# Patient Record
Sex: MALE | Race: WHITE
[De-identification: names, ages, dates, MRNs, and addresses within clinical notes are randomized per-mention and may not be internally consistent; named-entity substitution may affect disease eponyms.]

---

## 2017-04-16 ENCOUNTER — HOSPITAL ENCOUNTER (OUTPATIENT)
Dept: HOSPITAL 25 - ED | Age: 67
Setting detail: OBSERVATION
LOS: 1 days | Discharge: HOME | End: 2017-04-17
Attending: INTERNAL MEDICINE | Admitting: INTERNAL MEDICINE
Payer: MEDICARE

## 2017-04-16 DIAGNOSIS — R94.31: ICD-10-CM

## 2017-04-16 DIAGNOSIS — I47.1: ICD-10-CM

## 2017-04-16 DIAGNOSIS — R07.89: Primary | ICD-10-CM

## 2017-04-16 DIAGNOSIS — E66.9: ICD-10-CM

## 2017-04-16 DIAGNOSIS — I25.10: ICD-10-CM

## 2017-04-16 DIAGNOSIS — Z79.899: ICD-10-CM

## 2017-04-16 DIAGNOSIS — K21.9: ICD-10-CM

## 2017-04-16 DIAGNOSIS — I10: ICD-10-CM

## 2017-04-16 DIAGNOSIS — Z88.8: ICD-10-CM

## 2017-04-16 LAB
ALBUMIN SERPL BCG-MCNC: 3.9 G/DL (ref 3.2–5.2)
ALP SERPL-CCNC: 70 U/L (ref 34–104)
ALT SERPL W P-5'-P-CCNC: 44 U/L (ref 7–52)
ANION GAP SERPL CALC-SCNC: 8 MMOL/L (ref 2–11)
AST SERPL-CCNC: 29 U/L (ref 13–39)
BUN SERPL-MCNC: 13 MG/DL (ref 6–24)
BUN/CREAT SERPL: 13 (ref 8–20)
CALCIUM SERPL-MCNC: 9 MG/DL (ref 8.6–10.3)
CHLORIDE SERPL-SCNC: 100 MMOL/L (ref 101–111)
GLOBULIN SER CALC-MCNC: 2.7 G/DL (ref 2–4)
GLUCOSE SERPL-MCNC: 102 MG/DL (ref 70–100)
HCO3 SERPL-SCNC: 24 MMOL/L (ref 22–32)
HCT VFR BLD AUTO: 45 % (ref 42–52)
HGB BLD-MCNC: 15.4 G/DL (ref 14–18)
MCH RBC QN AUTO: 32 PG (ref 27–31)
MCHC RBC AUTO-ENTMCNC: 35 G/DL (ref 31–36)
MCV RBC AUTO: 92 FL (ref 80–94)
POTASSIUM SERPL-SCNC: 4 MMOL/L (ref 3.5–5)
PROT SERPL-MCNC: 6.6 G/DL (ref 6.4–8.9)
RBC # BLD AUTO: 4.84 10^6/UL (ref 4–5.4)
SODIUM SERPL-SCNC: 132 MMOL/L (ref 133–145)
TROPONIN I SERPL-MCNC: 0 NG/ML (ref ?–0.04)
WBC # BLD AUTO: 7.7 10^3/UL (ref 3.5–10.8)

## 2017-04-16 PROCEDURE — 85025 COMPLETE CBC W/AUTO DIFF WBC: CPT

## 2017-04-16 PROCEDURE — 78452 HT MUSCLE IMAGE SPECT MULT: CPT

## 2017-04-16 PROCEDURE — 80061 LIPID PANEL: CPT

## 2017-04-16 PROCEDURE — 84484 ASSAY OF TROPONIN QUANT: CPT

## 2017-04-16 PROCEDURE — 85379 FIBRIN DEGRADATION QUANT: CPT

## 2017-04-16 PROCEDURE — 93005 ELECTROCARDIOGRAM TRACING: CPT

## 2017-04-16 PROCEDURE — 80053 COMPREHEN METABOLIC PANEL: CPT

## 2017-04-16 PROCEDURE — 99284 EMERGENCY DEPT VISIT MOD MDM: CPT

## 2017-04-16 PROCEDURE — 83880 ASSAY OF NATRIURETIC PEPTIDE: CPT

## 2017-04-16 PROCEDURE — 36415 COLL VENOUS BLD VENIPUNCTURE: CPT

## 2017-04-16 PROCEDURE — G0378 HOSPITAL OBSERVATION PER HR: HCPCS

## 2017-04-16 PROCEDURE — A9502 TC99M TETROFOSMIN: HCPCS

## 2017-04-16 PROCEDURE — 93017 CV STRESS TEST TRACING ONLY: CPT

## 2017-04-16 PROCEDURE — 71010: CPT

## 2017-04-16 PROCEDURE — 83605 ASSAY OF LACTIC ACID: CPT

## 2017-04-16 PROCEDURE — 83036 HEMOGLOBIN GLYCOSYLATED A1C: CPT

## 2017-04-16 PROCEDURE — 85610 PROTHROMBIN TIME: CPT

## 2017-04-16 RX ADMIN — METOPROLOL TARTRATE SCH MG: 25 TABLET, FILM COATED ORAL at 21:07

## 2017-04-16 NOTE — HP
ADMISSION HISTORY AND PHYSICAL:

 

DATE OF ADMISSION:  17

 

PRIMARY CARE PROVIDER:  Dr. Urbina.

 

RHEUMATOLOGIST:  Dr. Zurita.

 

CARDIOLOGIST:  Dr. Stacy.

 

HEALTHCARE PROXY:  _____.

 

CODE STATUS:  DNR/DNI discussed at length with the patient.

 

SOURCE OF INFORMATION:  History obtained from interview with the patient, 
review of past medical records, and [facility].

 

RELIABILITY:  Good.

 

CHIEF COMPLAINT:  Chest heaviness.

 

HISTORY OF PRESENT ILLNESS:  This is a 67-year-old man, past medical history of 
CAD with MI in 2007 as well as history of noncardiac chest pain.  Last stress 
test 2015 notable for poor exercise capacity, did have transient chest 
tightness at that time, and achieved 85% of target heart rate with no EKG 
changes.  Nuclear imaging showed at that time fixed apical defect consistent 
with apical thinning similar to previous imaging without evidence of ischemia.  
This morning at 10 a.m., he was doing laundry and felt the heaviness over his 
chest described as small animal approximately 12 pounds when asked to assign a 
weight, radiating to this throat like there is a "lump in his throat," similar 
in description to the described pain in 2015.  He felt suddenly tired without 
associated diaphoresis.  No nausea, vomiting, palpitations, or syncope.  He 
noticed that the discomfort lasted for several hours.  So, he drove himself to 
the emergency room, noted it resolved prior to presenting.  He did not take any 
nitroglycerin.  He describes that this discomfort as different than in 2007, 
which was described as sharp.  His exercise is limited by joint pain.  He walks 
down the hill and back up again to get his daughter from the bus.  It is 
approximately tenth of a mile.  He walks slowly and does not experience chest 
discomfort.  Of note, his only medication is metoprolol and does not take any 
antiplatelet agents as primary prevention at his own discretion.

 

When seen by this author, the patient had no complaints.

 

PAST MEDICAL HISTORY:  CAD with an MI in , history of noncardiac chest pain
, worked up in 2015, history of GERD, chronic pain syndrome, 
hypertension, SVT, sleep apnea, left knee reconstruction, diabetes per _____ 
which the patient denies, obesity, SVT with rates up to 220.

 

MEDICATIONS:

1.  Metoprolol 12.5 mg twice daily.

2.  Tylenol 500 mg every 6 hours as needed for pain.

 

ALLERGIES:  To ALTACE, STATINS, and LATEX.

 

FAMILY HISTORY:  His mother  of complications from lupus.  His father had 
no significant medical history.

 

SOCIAL HISTORY:  No alcohol, tobacco, or illicits.  Retired .

 

REVIEW OF SYSTEMS:  As per HPI.  Otherwise, all other systems negative.

 

                               PHYSICAL EXAMINATION

 

GENERAL:  Obese man, sitting up in the bed, interactive, pleasant, in no 
apparent distress.

 

VITAL SIGNS:  In the emergency room, when seen by this author, 164/109, 185/117 
on presentation; heart rate 99; respiratory rate 16; 96% on room air; T-max in 
the emergency room 98.2.

 

HEENT:  Oropharynx is clear.  He has moist mucous membranes.  Sclerae are 
anicteric.

 

NECK:  Has non-elevated JVD.  No supraclavicular or cervical lymphadenopathy.

 

LUNGS:  Clear to auscultation.

 

HEART:  Regular rate and rhythm.  Soft 1 to 2 out of 6 systolic ejection murmur.

 

ABDOMEN:  Soft, nontender, nondistended with positive bowel sounds.

 

EXTREMITIES:  Warm and well perfused without clubbing, cyanosis, or edema.  
Good skin turgor.  Less than 2-second capillary refill.

 

NEUROLOGIC:  He is alert and oriented x3.  His cranial nerves II through XII 
are intact.  Gait was not assessed.  There is no apparent anxiety, agitation, 
or depression.

 

 LABORATORY DATA AND DIAGNOSTIC STUDIES:  Laboratory data reviewed.  Notable 
for troponin 0.00.  BNP 11, BUN 13, creatinine 1.0, sodium 132.  D-dimer less 
than 200. White blood cell count 7.7, hemoglobin 15.4, and platelets 200.

 

EKG:  Sinus tachycardia, 97, left axis, T-wave inversion in aVL and early R-
wave progression.

 

Chest x-ray:  Low lung volumes.  No cardiopulmonary disease.

 

ASSESSMENT AND PLAN:  This is a 67-year-old man, past medical history of 
coronary artery disease and myocardial infarction in  as well as history of 
noncardiac chest pain, last worked up in 2015, presenting to the 
hospital with chest discomfort described as heavy and is radiating to his 
throat.

 

Chest pain:  In the setting of history of coronary artery disease, certainly 
concern is the patient's symptoms can represent ischemia, although not 
corroborated by negative troponin and normal EKG at this time.  There has been 
concern in the past of esophageal spasm, seen by Dr. Stacy in 2015.  
The patient has been evaluated in the past, , by our Gastroenterology 
group.  However, specifically, this gentleman is obese and on no secondary 
prophylaxis of antiplatelet agents, I believe is warranting serial troponins 
and stress test prior to discharge home.  We will check serial troponins.  
Continue aspirin while here.  Plan on chemical stress with nuclear imaging.  
The patient has inability to exercise due to joint pain. Check lipids in the 
morning.  Add on hemoglobin A1c to labs in the hospital.

 

Hypertension:  Continue metoprolol.  Last blood pressure 164/109, improving 
from prior.  The patient is resistant to new medications.  Therefore, await to 
see what blood pressure levels are after leaving the emergency room.  If it 
remains elevated, likely need to start new oral agent.

 

Gastroesophageal reflux disease:  Currently, also on no medications.  Does take 
something over-the-counter which he cannot name.  We will start him on 
omeprazole while hospitalized. DVT prophylaxis:  Enoxaparin.

 

Code status:  DNR/DNI discussed with the patient.  Filled out MOLST with him.

 

CC:  Dr. Urbina; Dr. Zurita; Dr. Stacy*

 

[id number]/CPS #: [id number]

MTDD

## 2017-04-16 NOTE — RAD
HISTORY: Chest pain



COMPARISONS: June 22, 2015



VIEWS:1: Single frontal portable view of the chest at 1:40 PM



FINDINGS:

LINES AND TUBES: None.

CARDIOMEDIASTINAL SILHOUETTE: The cardiomediastinal silhouette is normal for portable

technique.

PLEURA: The costophrenic angles are sharp. No pleural abnormalities are noted.

LUNG PARENCHYMA: The lung volumes are low. The lungs are clear accounting for the phase of

respiration.

ABDOMEN: The upper abdomen is clear. There is no subphrenic gas.

BONES AND SOFT TISSUES: No bone or soft tissue abnormalities are noted.



IMPRESSION: LOW LUNG VOLUMES. NO ACTIVE CARDIOPULMONARY DISEASE.

## 2017-04-16 NOTE — ED
Shefali RAYA Janilya, scribed for Chadd Brooks MD on 04/16/17 at 1340 .





HPI Chest Pain





- HPI Summary


HPI Summary: 


[facility] 68 y/[facility] w/ a gradual onset of constant CP 

that happened today at approx 1000. Pt was at rest and folding laundry. The 

pain is described like "there is weight on my chest". Pt states it is worse 

than his baseline CP that occurs once a week. In addition, there were two sharp 

pains last week. However, what has brought the pt to the ED was the fact that 

in association w/ CP, pt experienced jaw tightness and extreme fatigue. He also 

reports recent weight gain that has been steadily increasing. 


Pt has not taken ASA. He regularly takes Metoprolol for HTN. And he usually 

takes 500 mg Tylenol for pains. While at the ED, pt states the pain has not 

worsened but mildly improved. 





- History of Current Complaint


Chief Complaint: EDChestPainROMI


Time Seen by Provider: 04/16/17 12:58


Hx Obtained From: Patient


Onset/Duration: Started Hours Ago, Atraumatic, Still Present


Timing: Constant


Initial Severity: Moderate


Current Severity: Moderate


Pain Intensity: 0


Chest Pain Location: Diffuse


Chest Pain Radiates: No


Character: Heaviness


Aggravating Factor(s): Nothing


Alleviating Factor(s): Nothing





- Allergy/Home Medications


Allergies/Adverse Reactions: 


 Allergies











Allergy/AdvReac Type Severity Reaction Status Date / Time


 


Latex Allergy Severe Rash Verified 04/03/17 11:00


 


Statins Allergy Mild Muscle Ache Verified 04/03/17 11:00


 


Ramipril [From Altace] Allergy  Unknown Verified 04/03/17 11:00





   Reaction  





   Details  











Home Medications: 


 Home Medications





Acetaminophen 500 mg PO Q4HR PRN 04/16/17 [History Confirmed 04/16/17]











PMH/Surg Hx/FS Hx/Imm Hx


Previously Healthy: Yes


Endocrine/Hematology History: 


   Denies: Hx Diabetes


Cardiovascular History: Reports: Hx Angina, Hx Coronary Artery Disease, Hx 

Hypercholesterolemia, Hx Hypertension - MEDS, Hx Myocardial Infarction


   Denies: Hx Congestive Heart Failure - couple yrs ago, Hx Pacemaker/ICD


Respiratory History: 


   Denies: Hx Asthma, Hx Chronic Obstructive Pulmonary Disease (COPD)


GI History: 


   Denies: Other GI Disorders


 History: 


   Denies: Hx Renal Disease


Sensory History: 


   Denies: Hx Hearing Aid


Psychiatric History: 


   Denies: Hx Panic Disorder





- Surgical History


Surgery Procedure, Year, and Place: Lt  knee REALIGN/PLATE





- Immunization History


Date of Tetanus Vaccine: UTD


Date of Influenza Vaccine: UTD


Infectious Disease History: No


Infectious Disease History: 


   Denies: Traveled Outside the [place] in Last 30 Days





- Family History


Known Family History: Positive: Other - lupus


   Negative: Cardiac Disease, Hypertension





- Social History


Alcohol Use: Rare


Substance Use Type: Reports: None


Smoking Status (MU): Never Smoked Tobacco





Review of Systems


Positive: Fatigue


Positive: Chest Pain


Positive: Other - jaw tightness


All Other Systems Reviewed And Are Negative: Yes





Physical Exam


Triage Information Reviewed: Yes


Vital Signs On Initial Exam: 


 Initial Vitals











Temp Pulse Resp BP Pulse Ox


 


 97.7 F   100   18   185/117   98 


 


 04/16/17 11:34  04/16/17 11:34  04/16/17 11:34  04/16/17 11:34  04/16/17 11:34











Vital Signs Reviewed: Yes


Appearance: Positive: Well-Appearing, No Pain Distress


Skin: Positive: Warm, Skin Color Reflects Adequate Perfusion, Dry


Head/Face: Positive: Normal Head/Face Inspection


Eyes: Positive: Normal


ENT: Positive: Normal ENT inspection


Neck: Positive: Supple, Nontender


Respiratory/Lung Sounds: Positive: Clear to Auscultation, Breath Sounds Present


Cardiovascular: Positive: RRR


Abdomen Description: Positive: Nontender, Soft


Bowel Sounds: Positive: Present


Musculoskeletal: Positive: Edema Left - slightly pitting edema in both LE, 

Edema Right - slightly pitting edema in both LE


Neurological: Positive: Normal


Psychiatric: Positive: Affect/Mood Appropriate





- Alyssa Coma Scale


Coma Scale Total: 15





Diagnostics





- Vital Signs


 Vital Signs











  Temp Pulse Resp BP Pulse Ox


 


 04/16/17 12:14  98.2 F  96  18  154/103  96


 


 04/16/17 11:34  97.7 F  100  18  185/117  98














- Laboratory


Lab Results: 


 Lab Results











  04/16/17 04/16/17 04/16/17 Range/Units





  14:10 14:10 14:10 


 


WBC  7.7    (3.5-10.8)  10^3/ul


 


RBC  4.84    (4.0-5.4)  10^6/ul


 


Hgb  15.4    (14.0-18.0)  g/dl


 


Hct  45    (42-52)  %


 


MCV  92    (80-94)  fL


 


MCH  32 H    (27-31)  pg


 


MCHC  35    (31-36)  g/dl


 


RDW  13    (10.5-15)  %


 


Plt Count  200    (150-450)  10^3/ul


 


MPV  8    (7.4-10.4)  um3


 


Neut % (Auto)  56.1    (38-83)  %


 


Lymph % (Auto)  33.3    (25-47)  %


 


Mono % (Auto)  7.6    (1-9)  %


 


Eos % (Auto)  2.0    (0-6)  %


 


Baso % (Auto)  1.0    (0-2)  %


 


Absolute Neuts (auto)  4.3    (1.5-7.7)  10^3/ul


 


Absolute Lymphs (auto)  2.6    (1.0-4.8)  10^3/ul


 


Absolute Monos (auto)  0.6    (0-0.8)  10^3/ul


 


Absolute Eos (auto)  0.2    (0-0.6)  10^3/ul


 


Absolute Basos (auto)  0.1    (0-0.2)  10^3/ul


 


Absolute Nucleated RBC  0.01    10^3/ul


 


Nucleated RBC %  0.1    


 


INR (Anticoag Therapy)   1.02   (0.89-1.11)  


 


D-Dimer, Quantitative   < 200   (Less Than 230)  ng/mL


 


Sodium    132 L  (133-145)  mmol/L


 


Potassium    4.0  (3.5-5.0)  mmol/L


 


Chloride    100 L  (101-111)  mmol/L


 


Carbon Dioxide    24  (22-32)  mmol/L


 


Anion Gap    8  (2-11)  mmol/L


 


BUN    13  (6-24)  mg/dL


 


Creatinine    1.00  (0.67-1.17)  mg/dL


 


Est GFR ( Amer)    96.4  (>60)  


 


Est GFR (Non-Af Amer)    75.0  (>60)  


 


BUN/Creatinine Ratio    13.0  (8-20)  


 


Glucose    102 H  ()  mg/dL


 


Hemoglobin A1c     (Less than 6.0)  %


 


Lactic Acid     (0.5-2.0)  mmol/L


 


Calcium    9.0  (8.6-10.3)  mg/dL


 


Total Bilirubin    0.60  (0.2-1.0)  mg/dL


 


AST    29  (13-39)  U/L


 


ALT    44  (7-52)  U/L


 


Alkaline Phosphatase    70  ()  U/L


 


Troponin I    0.00  (<0.04)  ng/mL


 


B-Natriuretic Peptide    ( - 100) pg/mL


 


Total Protein    6.6  (6.4-8.9)  g/dL


 


Albumin    3.9  (3.2-5.2)  g/dL


 


Globulin    2.7  (2-4)  g/dL


 


Albumin/Globulin Ratio    1.4  (1-3)  














  04/16/17 04/16/17 04/16/17 Range/Units





  14:10 14:10 14:10 


 


WBC     (3.5-10.8)  10^3/ul


 


RBC     (4.0-5.4)  10^6/ul


 


Hgb     (14.0-18.0)  g/dl


 


Hct     (42-52)  %


 


MCV     (80-94)  fL


 


MCH     (27-31)  pg


 


MCHC     (31-36)  g/dl


 


RDW     (10.5-15)  %


 


Plt Count     (150-450)  10^3/ul


 


MPV     (7.4-10.4)  um3


 


Neut % (Auto)     (38-83)  %


 


Lymph % (Auto)     (25-47)  %


 


Mono % (Auto)     (1-9)  %


 


Eos % (Auto)     (0-6)  %


 


Baso % (Auto)     (0-2)  %


 


Absolute Neuts (auto)     (1.5-7.7)  10^3/ul


 


Absolute Lymphs (auto)     (1.0-4.8)  10^3/ul


 


Absolute Monos (auto)     (0-0.8)  10^3/ul


 


Absolute Eos (auto)     (0-0.6)  10^3/ul


 


Absolute Basos (auto)     (0-0.2)  10^3/ul


 


Absolute Nucleated RBC     10^3/ul


 


Nucleated RBC %     


 


INR (Anticoag Therapy)     (0.89-1.11)  


 


D-Dimer, Quantitative     (Less Than 230)  ng/mL


 


Sodium     (133-145)  mmol/L


 


Potassium     (3.5-5.0)  mmol/L


 


Chloride     (101-111)  mmol/L


 


Carbon Dioxide     (22-32)  mmol/L


 


Anion Gap     (2-11)  mmol/L


 


BUN     (6-24)  mg/dL


 


Creatinine     (0.67-1.17)  mg/dL


 


Est GFR ( Amer)     (>60)  


 


Est GFR (Non-Af Amer)     (>60)  


 


BUN/Creatinine Ratio     (8-20)  


 


Glucose     ()  mg/dL


 


Hemoglobin A1c    5.5  (Less than 6.0)  %


 


Lactic Acid  1.1    (0.5-2.0)  mmol/L


 


Calcium     (8.6-10.3)  mg/dL


 


Total Bilirubin     (0.2-1.0)  mg/dL


 


AST     (13-39)  U/L


 


ALT     (7-52)  U/L


 


Alkaline Phosphatase     ()  U/L


 


Troponin I     (<0.04)  ng/mL


 


B-Natriuretic Peptide   11  ( - 100) pg/mL


 


Total Protein     (6.4-8.9)  g/dL


 


Albumin     (3.2-5.2)  g/dL


 


Globulin     (2-4)  g/dL


 


Albumin/Globulin Ratio     (1-3)  











Result Diagrams: 


 04/16/17 14:10





 04/16/17 14:10


Lab Statement: Any lab studies that have been ordered have been reviewed, and 

results considered in the medical decision making process.





- Radiology


  ** CXR


Xray Interpretation: Positive (See Comments) - IMPRESSION: LOW LUNG VOLUMES. NO 

ACTIVE CARDIOPULMONARY DISEASE.


Radiology Interpretation Completed By: Radiologist





- EKG


  ** 1153


Cardiac Rate: NL - 97 bpm


EKG Rhythm: Sinus Rhythm


ST Segment: Non-Specific





Chest Pain Course/Dx





- Diagnoses


Provider Diagnoses: 


 Chest pain at rest








Discharge





- Discharge Plan


Condition: Stable


Disposition: ADMITTED TO [facility] documentation as recorded by the Shefali jean Janilya accurately 

reflects the service I personally performed and the decisions made by me, Chadd Brooks MD.

## 2017-04-17 VITALS — DIASTOLIC BLOOD PRESSURE: 88 MMHG | SYSTOLIC BLOOD PRESSURE: 148 MMHG

## 2017-04-17 LAB
CHOLEST SERPL-MCNC: 182 MG/DL
HDLC SERPL-MCNC: 24.4 MG/DL
TRIGL SERPL-MCNC: 168 MG/DL

## 2017-04-17 RX ADMIN — METOPROLOL TARTRATE SCH MG: 25 TABLET, FILM COATED ORAL at 10:16

## 2017-04-17 NOTE — DCNOTE
Subjective


Date of Service: 04/17/17


Interval History: 





No chest pain, cough, SOB, palpitations since admission. Pain lasted 4-5 hours.

  Sedentary due to joint pains. 





Objective


Active Medications: 








Acetaminophen (Tylenol Tab*)  650 mg PO Q4H PRN


   PRN Reason: FEVER/PAIN


Aspirin (Aspirin Ec Low Dose*)  81 mg PO DAILY [facility]


   Last Admin: 04/17/17 10:17 Dose:  81 mg


Enoxaparin Sodium (Lovenox(*))  40 mg SUBCUT Q24H [facility]


   Last Admin: 04/16/17 18:05 Dose:  40 mg


Metoprolol Tartrate (Lopressor Tab*)  12.5 mg PO BID [facility]


   Last Admin: 04/17/17 10:16 Dose:  12.5 mg


Nitroglycerin (Nitroglycerin Tab 0.4 Mg*)  0.4 mg SL Q5M PRN


   PRN Reason: CHEST PRESSURE OR PAIN


   Last Admin: 04/16/17 18:10 Dose:  0.4 mg


Omeprazole (Prilosec Cap*)  20 mg PO 0600 [facility]


   Last Admin: 04/17/17 05:32 Dose:  20 mg


Ondansetron HCl (Zofran Inj*)  4 mg IV Q4H PRN


   PRN Reason: NAUSEA/VOMITING








 Vital Signs











  04/16/17 04/16/17 04/16/17





  15:53 16:42 17:28


 


Temperature 97.8 F 97.8 F 98.1 F


 


Pulse Rate 95 95 94


 


Respiratory 18 18 18





Rate   


 


Blood Pressure 173/97 156/101 160/100





(mmHg)   


 


O2 Sat by Pulse 94  96





Oximetry   














  04/16/17 04/16/17 04/16/17





  17:40 18:21 19:36


 


Temperature   98.1 F


 


Pulse Rate   99


 


Respiratory 18  18





Rate   


 


Blood Pressure  148/76 143/92





(mmHg)   


 


O2 Sat by Pulse   95





Oximetry   














  04/16/17 04/16/17 04/17/17





  23:27 23:33 03:16


 


Temperature  98.2 F 98.2 F


 


Pulse Rate 83 86 79


 


Respiratory  20 16





Rate   


 


Blood Pressure  113/74 129/68





(mmHg)   


 


O2 Sat by Pulse  94 97





Oximetry   














  04/17/17 04/17/17 04/17/17





  06:16 10:09 12:06


 


Temperature  98.1 F 98.2 F


 


Pulse Rate 87 91 75


 


Respiratory  20 16





Rate   


 


Blood Pressure  150/89 153/102





(mmHg)   


 


O2 Sat by Pulse  95 97





Oximetry   














  04/17/17





  12:14


 


Temperature 


 


Pulse Rate 


 


Respiratory 





Rate 


 


Blood Pressure 148/88





(mmHg) 


 


O2 Sat by Pulse 





Oximetry 











Oxygen Devices in Use Now: None


Appearance: Alert, in a chair.  In good spirits. Looks comfortable.


Ears/Nose/Mouth/Throat: Clear Oropharnyx, Mucous Membranes Moist


Neck: NL Appearance and Movements; NL JVP, No Thyroid Enlargement, Masses


Respiratory: Symmetrical Chest Expansion and Respiratory Effort, Clear to 

Auscultation, Clear to Percussion


Cardiovascular: NL Sounds; No Murmurs; No JVD, RRR, No Edema, -


Extremities: No Edema, No Clubbing, Cyanosis, -


Skin: No Rash or Ulcers, No Nodules or Sclerosis, -


Neurological: Alert and Oriented x 3, NL Sensation


Result Diagrams: 


 04/16/17 14:10





 04/16/17 14:10


Additional Lab and Data: 


 Lab Results











  04/16/17 04/16/17 04/16/17 Range/Units





  14:10 14:10 14:10 


 


WBC  7.7    (3.5-10.8)  10^3/ul


 


RBC  4.84    (4.0-5.4)  10^6/ul


 


Hgb  15.4    (14.0-18.0)  g/dl


 


Hct  45    (42-52)  %


 


MCV  92    (80-94)  fL


 


MCH  32 H    (27-31)  pg


 


MCHC  35    (31-36)  g/dl


 


RDW  13    (10.5-15)  %


 


Plt Count  200    (150-450)  10^3/ul


 


MPV  8    (7.4-10.4)  um3


 


Neut % (Auto)  56.1    (38-83)  %


 


Lymph % (Auto)  33.3    (25-47)  %


 


Mono % (Auto)  7.6    (1-9)  %


 


Eos % (Auto)  2.0    (0-6)  %


 


Baso % (Auto)  1.0    (0-2)  %


 


Absolute Neuts (auto)  4.3    (1.5-7.7)  10^3/ul


 


Absolute Lymphs (auto)  2.6    (1.0-4.8)  10^3/ul


 


Absolute Monos (auto)  0.6    (0-0.8)  10^3/ul


 


Absolute Eos (auto)  0.2    (0-0.6)  10^3/ul


 


Absolute Basos (auto)  0.1    (0-0.2)  10^3/ul


 


Absolute Nucleated RBC  0.01    10^3/ul


 


Nucleated RBC %  0.1    


 


INR (Anticoag Therapy)   1.02   (0.89-1.11)  


 


D-Dimer, Quantitative   < 200   (Less Than 230)  ng/mL


 


Sodium    132 L  (133-145)  mmol/L


 


Potassium    4.0  (3.5-5.0)  mmol/L


 


Chloride    100 L  (101-111)  mmol/L


 


Carbon Dioxide    24  (22-32)  mmol/L


 


Anion Gap    8  (2-11)  mmol/L


 


BUN    13  (6-24)  mg/dL


 


Creatinine    1.00  (0.67-1.17)  mg/dL


 


Est GFR ( Amer)    96.4  (>60)  


 


Est GFR (Non-Af Amer)    75.0  (>60)  


 


BUN/Creatinine Ratio    13.0  (8-20)  


 


Glucose    102 H  ()  mg/dL


 


Hemoglobin A1c     (Less than 6.0)  %


 


Lactic Acid     (0.5-2.0)  mmol/L


 


Calcium    9.0  (8.6-10.3)  mg/dL


 


Total Bilirubin    0.60  (0.2-1.0)  mg/dL


 


AST    29  (13-39)  U/L


 


ALT    44  (7-52)  U/L


 


Alkaline Phosphatase    70  ()  U/L


 


Troponin I    0.00  (<0.04)  ng/mL


 


B-Natriuretic Peptide    ( - 100) pg/mL


 


Total Protein    6.6  (6.4-8.9)  g/dL


 


Albumin    3.9  (3.2-5.2)  g/dL


 


Globulin    2.7  (2-4)  g/dL


 


Albumin/Globulin Ratio    1.4  (1-3)  














  04/16/17 04/16/17 04/16/17 Range/Units





  14:10 14:10 14:10 


 


WBC     (3.5-10.8)  10^3/ul


 


RBC     (4.0-5.4)  10^6/ul


 


Hgb     (14.0-18.0)  g/dl


 


Hct     (42-52)  %


 


MCV     (80-94)  fL


 


MCH     (27-31)  pg


 


MCHC     (31-36)  g/dl


 


RDW     (10.5-15)  %


 


Plt Count     (150-450)  10^3/ul


 


MPV     (7.4-10.4)  um3


 


Neut % (Auto)     (38-83)  %


 


Lymph % (Auto)     (25-47)  %


 


Mono % (Auto)     (1-9)  %


 


Eos % (Auto)     (0-6)  %


 


Baso % (Auto)     (0-2)  %


 


Absolute Neuts (auto)     (1.5-7.7)  10^3/ul


 


Absolute Lymphs (auto)     (1.0-4.8)  10^3/ul


 


Absolute Monos (auto)     (0-0.8)  10^3/ul


 


Absolute Eos (auto)     (0-0.6)  10^3/ul


 


Absolute Basos (auto)     (0-0.2)  10^3/ul


 


Absolute Nucleated RBC     10^3/ul


 


Nucleated RBC %     


 


INR (Anticoag Therapy)     (0.89-1.11)  


 


D-Dimer, Quantitative     (Less Than 230)  ng/mL


 


Sodium     (133-145)  mmol/L


 


Potassium     (3.5-5.0)  mmol/L


 


Chloride     (101-111)  mmol/L


 


Carbon Dioxide     (22-32)  mmol/L


 


Anion Gap     (2-11)  mmol/L


 


BUN     (6-24)  mg/dL


 


Creatinine     (0.67-1.17)  mg/dL


 


Est GFR ( Amer)     (>60)  


 


Est GFR (Non-Af Amer)     (>60)  


 


BUN/Creatinine Ratio     (8-20)  


 


Glucose     ()  mg/dL


 


Hemoglobin A1c    5.5  (Less than 6.0)  %


 


Lactic Acid  1.1    (0.5-2.0)  mmol/L


 


Calcium     (8.6-10.3)  mg/dL


 


Total Bilirubin     (0.2-1.0)  mg/dL


 


AST     (13-39)  U/L


 


ALT     (7-52)  U/L


 


Alkaline Phosphatase     ()  U/L


 


Troponin I     (<0.04)  ng/mL


 


B-Natriuretic Peptide   11  ( - 100) pg/mL


 


Total Protein     (6.4-8.9)  g/dL


 


Albumin     (3.2-5.2)  g/dL


 


Globulin     (2-4)  g/dL


 


Albumin/Globulin Ratio     (1-3)  














Assess/Plan/Problems-Billing


Assessment: 











- Patient Problems


(1) Atypical chest pain


Current Visit: Yes   Status: Acute   Code(s): R07.89 - OTHER CHEST PAIN   

SNOMED Code(s): 040870668


   Comment: Possible esophageal spasm, reflux, musculoskeletal pain.  Fup Dr. Urbina.    





(2) CAD (coronary artery disease)


Current Visit: Yes   Status: Acute   Code(s): I25.10 - ATHSCL HEART DISEASE OF 

NATIVE CORONARY ARTERY W/O ANG PCTRS   SNOMED Code(s): 81013228


   Comment: Patient stopped his ASA a few months ago on his own.  I encouraged 

him to continue it.  I told him to discuss a low dose of statin with Dr. Urbina.  Continue BB.    


Status and Disposition: 





Discharge now.  Fup Dr. Urbina.

## 2017-04-17 NOTE — RAD
Indication: Chest pain.



Myocardial perfusion scan was performed utilizing 1 day protocol.



10.8 mCi of technetium 99 and tetrofosmin was injected for the rest portion of study. 25.3

series of technetium 99 and tetrofosmin was injected for the stress portion of the study

after pharmacological stress.



There is homogeneous distribution of the radiotracer throughout the left. There is

inferior wall photopenia which appears to correct on the attenuation corrected images. No

definite fixed or reversible perfusion defect identified.



The ejection fraction at stress is 58%. Evaluation of wall motion demonstrates no focal

wall motion abnormality.



IMPRESSION: No definite fixed or reversible perfusion defect is identified.



Normal ejection fraction.



ASSESSMENT:  Low risk



Based on imaging criteria from ACC/AHA 2002 Guideline Update for the Management of

Patients With Chronic Stable Angina Table 23. Noninvasive Risk Stratification.

## 2017-04-18 NOTE — DS
DISCHARGE SUMMARY:

 

DATE OF ADMISSION:  04/16/17

 

DATE OF DISCHARGE:  04/17/17

 

HISTORY OF PRESENT ILLNESS:  This 67-year-old man came with a chief complaint 
of chest heaviness.  He was doing some laundry.  There was heaviness like 
pressure on his chest radiating to his throat.  He said the pain lasted 4 or 5 
hours.  He did not have any nitroglycerin at home.  It subsided soon after he 
got to the emergency room.

 

I know he is quite sedentary due to joint pain.  He also stated he had stopped 
his aspirin a few months ago on his own decision.  I do not think he had 
consulted Dr. Urbina about this.  He said he was not having any trouble with 
the aspirin, but really had no reason for stopping it.  I have encouraged him 
to continue taking it one every day.

 

The patient was admitted to the telemetry unit.  He had 3 troponins, all of 
which were within normal limits.  Stress test showed normal ejection fraction.  
No evidence for infarction or ischemia.  He had no further symptoms in the 
hospital.

 

FINAL DIAGNOSES:

1.  Atypical chest pain.

2.  Coronary artery disease.

 

DISCHARGE MEDICATIONS:

1.  Aspirin 81 mg daily.

2.  Metoprolol 12.5 mg b.i.d.

 

I told the patient to discuss possible use of low dose statin with Dr. Urbina, 
as he has had trouble with muscle aches with statins in the past.

 

CC:  Dr. Urbina*

 

 [id number]/[facility] #: [id number]

[id number]

## 2018-02-27 ENCOUNTER — HOSPITAL ENCOUNTER (EMERGENCY)
Dept: HOSPITAL 25 - UCEAST | Age: 68
Discharge: HOME | End: 2018-02-27
Payer: MEDICARE

## 2018-02-27 VITALS — SYSTOLIC BLOOD PRESSURE: 149 MMHG | DIASTOLIC BLOOD PRESSURE: 93 MMHG

## 2018-02-27 DIAGNOSIS — S50.861A: Primary | ICD-10-CM
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
## 2018-02-27 DIAGNOSIS — R03.0: ICD-10-CM
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
## 2018-02-27 PROCEDURE — G0463 HOSPITAL OUTPT CLINIC VISIT: HCPCS

## 2018-02-27 PROCEDURE — 99211 OFF/OP EST MAY X REQ PHY/QHP: CPT

## 2018-02-27 NOTE — UC
Skin Complaint HPI





- HPI Summary


HPI Summary: 





66 y/o male presents to the urgent care c/o tick bite on his RT upper arm since 

this morning. He removed it completely. He has mild redness around tick bite. 

He doesn't know for how long it was there, but it wasn't engorged. Pt states 

his friend and son has Lyme disease and he is requesting full treatment for 

Lyme disease. Pt denies pain, fever, HA, joint pains, chest pain, SOB, chest 

pain, rash, abdominal pain, N/V/D








- History of Current Complaint


Chief Complaint: UCSkin


Time Seen by Provider: 02/27/18 18:47


Stated Complaint: TICK BITE


Hx Obtained From: Patient


Onset/Duration: Sudden Onset, Lasting Days - 1 day, Resolved - Pt removed tick


Skin Exposure Onset/Duration: Days Ago - 1 day


Onset Severity: Moderate


Current Severity: Moderate


Pain Intensity: 6


Pain Scale Used: 0-10 Numeric


Location: Discrete - RT upper arm


Character: Redness


Aggravating Factor(s): Touch


Alleviating Factor(s): Nothing


Associated Signs & Symptoms: Positive: Rash.  Negative: Fever, Chills, Drainage

, Tenderness


Related History: Possible Reaction to: Insect





- Allergy/Home Medications


Allergies/Adverse Reactions: 


 Allergies











Allergy/AdvReac Type Severity Reaction Status Date / Time


 


latex Allergy  Rash Verified 02/27/18 16:41


 


ramipril Allergy  Rash Verified 02/27/18 16:41











Home Medications: 


 Home Medications





hydroCHLOROthiazide [Hydrochlorothiazide]  02/27/18 [History]











Review of Systems


Constitutional: Negative


Skin: Rash - tick bite on the Rt upper arm


Eyes: Negative


ENT: Negative


Respiratory: Negative


Cardiovascular: Negative


Gastrointestinal: Negative


Genitourinary: Negative


Motor: Negative


Neurovascular: Negative


Musculoskeletal: Negative


Neurological: Negative


Psychological: Negative


Is Patient Immunocompromised?: No


All Other Systems Reviewed And Are Negative: Yes





PMH/Surg Hx/FS Hx/Imm Hx


Previously Healthy: Yes


Other Endocrine History: Lupus





- Surgical History


Surgical History: Yes


Surgery Procedure, Year, and Place: Lt  knee REALIGN/PLATE





- Family History


Known Family History: 


   Negative: Cardiac Disease, Hypertension


Family History: Lupus





- Social History


Occupation: Employed Full-time


Lives: With Family


Alcohol Use: Rare


Substance Use Type: None


Smoking Status (MU): Never Smoked Tobacco





Physical Exam


Triage Information Reviewed: Yes


Vital Signs: 


 Initial Vital Signs











Temp  96.6 F   02/27/18 16:35


 


Pulse  72   02/27/18 16:35


 


Resp  16   02/27/18 16:35


 


BP  149/93   02/27/18 16:35


 


Pulse Ox  97   02/27/18 16:35














- Additional Comments





Vital Signs Reviewed: Yes


General: well developed, well nourished male sitting in the examining table w/o 

any apparent distress.


Eyes: Positive: Conjunctiva Clear - PERRLA, EOMI


ENT: Positive: Normal ENT inspection, Hearing grossly normal, Pharynx normal, 

TMs normal


Neck: Positive: Supple, Nontender, No Lymphadenopathy


Respiratory: Positive: Chest nontender, Lungs clear, Normal breath sounds


Cardiovascular: Positive: RRR, No Murmur, Pulses Normal


Abdomen Description: Positive: Nontender, No Organomegaly, Soft.  Negative: CVA 

Tenderness (R), CVA Tenderness (L)


Bowel Sounds: Positive: Present


Musculoskeletal: Positive: Strength Intact, ROM Intact, No Edema


Neurological Exam: Normal


Psychological Exam: Normal


Skin: Positive: rashes - Proximal medial aspect of right upper arm with tick 

bite with surrounding erythema, non tender to palpation. tick no longer present

, no swelling or drainage observed.








Course/Dx





- Course


Course Of Treatment: 66 y/o male presents to the urgent care c/o tick bite on 

his RT upper arm since this morning. He removed it completely. He has mild 

redness around tick bite. He doesn't know for how long it was there, but it wasn

't engorged. Pt states his friend and son has Lyme disease and he is requesting 

full treatment for Lyme disease. Pt denies pain, fever, HA, joint pains, chest 

pain, SOB, chest pain, rash, abdominal pain, N/V/D. Hx obtained. Despite 

educating Pt  about Lyme disease. Pt still persist for full Tx. Pt Rx 

Doxycycline PO.  Pt advised to observe the area for the development or Erythema 

Migrans for upto 30 days following exposure. Advised that there is possibility 

the serology returns negative the first 2 weeks of exposure.  Advised to f/u  

PCP for Lyme serology in 2 weeks and further management.Pt's BP is elevated 

today advised to decrease salt in diet, monitor BP and f/u with PCP for further 

management.   Pt understood and agreed with plan of care.





- Differential Diagnoses - Skin Complaint


Differential Diagnoses: Local Allergic Reaction, Scabies, Tick Born Illness, 

Other - beg bugs, insect bite





- Diagnoses


Provider Diagnoses: 1- Acute tick bite.  2-Elevated BP w/o Hx of HTN





Discharge





- Discharge Plan


Condition: Stable


Disposition: HOME


Patient Education Materials:  Tick Bite (ED), Low-Sodium Diet (ED)


Referrals: 


Grayson Urbina MD [Primary Care Provider] - 2 Weeks


Additional Instructions: 


1- Please observe the area for the development or Erythema Migrans for upto 30 

days following exposure. Components of the tick saliva can cause transient 

erythema that should not be confused with Erythema Migrans. If you develop the 

bull's eye rash, fever, joint pains please return to the urgent care or f/u 

with your PCP for further management.


2-Antibiotic prophylaxis with Doxycycline was given to you today to prevent 

lyme Disease. Lyme serology can be drawn in 2 weeks with your PCP to r/o Lyme 

disease since there is probability of negative results at early exposure.


3-Your BP is elevated today. please decrease salt in your diet, monitor BP and 

if it continues to be elevated please f/u with your PCP for further management

## 2018-10-21 ENCOUNTER — HOSPITAL ENCOUNTER (EMERGENCY)
Dept: HOSPITAL 25 - UCEAST | Age: 68
Discharge: HOME | End: 2018-10-21
Payer: MEDICARE

## 2018-10-21 VITALS — SYSTOLIC BLOOD PRESSURE: 160 MMHG | DIASTOLIC BLOOD PRESSURE: 98 MMHG

## 2018-10-21 DIAGNOSIS — Z91.040: ICD-10-CM

## 2018-10-21 DIAGNOSIS — K04.7: Primary | ICD-10-CM

## 2018-10-21 DIAGNOSIS — R03.0: ICD-10-CM

## 2018-10-21 DIAGNOSIS — Z88.8: ICD-10-CM

## 2018-10-21 PROCEDURE — G0463 HOSPITAL OUTPT CLINIC VISIT: HCPCS

## 2018-10-21 PROCEDURE — 99212 OFFICE O/P EST SF 10 MIN: CPT

## 2018-10-21 NOTE — UC
Throat Pain/Nasal Bashir HPI





- HPI Summary


HPI Summary: 





69 y/o male presents to the urgent care c/o left side of nose w/ pain and 

swelling around nose since yesterday.  Pt reports he also has some swelling w/ 

a blister on his left upper gums. Pt doesn't have upper teeth.  Pt is concerned 

about infection. Pain is 8/10. Pt denies fever, SOB, trismus, SOB, HA, 

abdominal pain, N/v/D. 








- History of Current Complaint


Chief Complaint: UCSkin


Stated Complaint: FACIAL PAIN POSSIBLE SINUS


Time Seen by Provider: 10/21/18 15:16


Hx Obtained From: Patient


Onset/Duration: Gradual Onset, Lasting Days - 1 day, Still Present, Worse Since 

- today


Severity: Moderate


Pain Intensity: 8


Pain Scale Used: 0-10 Numeric


Cough: None


Associated Signs & Symptoms: Positive: Other - left upper jaw swelling and gum 

pain.  Negative: Fever





- Epiglottits Risk Factors


Epiglottis Risk Factors: Negative





- Allergies/Home Medications


Allergies/Adverse Reactions: 


 Allergies











Allergy/AdvReac Type Severity Reaction Status Date / Time


 


latex Allergy  Rash Verified 10/21/18 15:07


 


ramipril Allergy  Rash Verified 10/21/18 15:07














PMH/Surg Hx/FS Hx/Imm Hx


Previously Healthy: Yes - Pt denies PMHX





- Surgical History


Surgical History: Yes


Surgery Procedure, Year, and Place: Lt  knee REALIGN/PLATE





- Family History


Known Family History: 


   Negative: Cardiac Disease, Hypertension


Family History: Lupus





- Social History


Occupation: Employed Full-time


Lives: With Family


Alcohol Use: None


Substance Use Type: None


Smoking Status (MU): Never Smoked Tobacco





Review of Systems


Constitutional: Negative


Skin: Negative


Eyes: Negative


ENT: Dental Pain - left upper jaw swelling and pain, Nasal Discharge - clear


Respiratory: Negative


Cardiovascular: Negative


Gastrointestinal: Negative


Genitourinary: Negative


Motor: Negative


Neurovascular: Negative


Musculoskeletal: Negative


Neurological: Negative


Psychological: Negative


Is Patient Immunocompromised?: No


All Other Systems Reviewed And Are Negative: Yes





Physical Exam





- Summary


Physical Exam Summary: 





Vital Signs Reviewed: Yes


General: Well-Appearing,  Well-Nourished male sitting in the examining table w/

o any respiratory or pain distress


Eyes: Positive: Conjunctiva Clear - PERRLA, EOMI,


ENT: Positive: Normal ENT inspection, Hearing grossly normal, Pharynx normal, 

TMs normal - B/L external ear canals clear,. Negative: Tonsillar swelling, 

Tonsillar exudate, Trismus


Dental: Positive: Upper jaw w/o any teeth. Pt w/ an aphthous ulcer and possible 

Abscess @ - gingival swelling and erythema, tender to percussion on the 

position of molar #12 w/ radiating swelling and tenderness to palpation on the 

lateral side of the nose, Positive Cervical Lymphadenopathy - anterior-


Neck: Positive: Supple


Respiratory: Positive: Chest non-tender, Lungs clear, Normal breath sounds, No 

respiratory distress


Cardiovascular: Positive: RRR, No Murmur, Pulses Normal, Brisk Capillary Refill


Abdomen Description: Positive: Nontender, No Organomegaly, Soft. Negative: CVA 

Tenderness (R), CVA Tenderness (L)


Bowel Sounds: Positive: Present


Musculoskeletal: Positive: Strength Intact, ROM Intact, No Edema


Neurological Exam: Normal


Psychological Exam: Normal


Skin Exam: Normal








Triage Information Reviewed: Yes


Vital Signs: 


 Initial Vital Signs











Temp  97.3 F   10/21/18 15:08


 


Pulse  101   10/21/18 15:08


 


Resp  18   10/21/18 15:08


 


BP  0/0   10/21/18 15:08


 


Pulse Ox  96   10/21/18 15:08














Throat Pain/Nasal Course/Dx





- Course


Course Of Treatment: 69 y/o male presents to the urgent care c/o left side of 

nose w/ pain and swelling around nose since yesterday.  Pt reports he also has 

some swelling w/ a blister on his left upper gums. Pt doesn't have upper teeth.

  Pt is concerned about infection. Pain is 8/10. Pt denies fever, SOB, trismus, 

SOB, HA, abdominal pain, N/v/D. Hx obtained.  Pt with dental abscess around 

molar #12 on examination.  Pt Rx clindamycin PO  and Ibuporfen PO for pain and 

viscous Lidocaine to alleviate pain. Pt strongly advised to f/u with Dentist as 

soon as possible further evaluation and treatment.  Pt's BP is elevated today 

advised to decrease salt in diet, monitor BP and f/u with PCP for further 

management. Pt understood and agreed with plan of care. Left the clinic 

ambulating.





- Differential Dx/Diagnosis


Differential Diagnosis/HQI/PQRI: Peritonsillar Abscess, Pharyngitis, Other - 

dental abscess, tootache, gross decay


Provider Diagnoses: 1- Dental abscess.  2- Elevated BP w/o Hx opf HTN





Discharge





- Sign-Out/Discharge


Documenting (check all that apply): Patient Departure - D/C home


All imaging exams completed and their final reports reviewed: No Studies





- Discharge Plan


Condition: Stable


Disposition: HOME


Prescriptions: 


Clindamycin Cap(NF) [Clindamycin Cap 300 mg Cap(NF)] 300 mg PO TID #30 cap


Ibuprofen TAB* [Motrin TAB* 800 MG] 800 mg PO Q6H PRN #30 tab


 PRN Reason: dental pain


Lidocaine 2% VISCOUS* [Xylocaine 2% Viscous*] 15 ml SWISH SPIT Q4H PRN #1 btl


 PRN Reason: dental pain


Patient Education Materials:  Dental Abscess (ED), Low-Sodium Diet (ED)


Referrals: 


Grayson Urbina MD [Primary Care Provider] - 2 Days


Additional Instructions: 


1-Please take full course of antibiotic to avoid resistance. Take yogurt w/ 

probiotics or take Culturelle to protect your GI system


2- Take Ibuporfen PO q6-8hrs prn  as instructed after meals to alleviate pain 

and swelling. TAke the Viscous lidocaine as directed to alleviate pain 


3- F/u with your Dentist or Dental List provided as soon as possible for 

further treatment.


4- If symptoms do not improve or worsen please return to the urgent care or f/u 

with your PCP in 2 days for further evaluation and treatment


5-Your BP is elevated today. please decrease salt in your diet, monitor BP and 

if it continues to be elevated please f/u with your PCP for further management























- Billing Disposition and Condition


Condition: STABLE


Disposition: Home